# Patient Record
Sex: MALE | Race: WHITE
[De-identification: names, ages, dates, MRNs, and addresses within clinical notes are randomized per-mention and may not be internally consistent; named-entity substitution may affect disease eponyms.]

---

## 2018-05-18 ENCOUNTER — HOSPITAL ENCOUNTER (OUTPATIENT)
Dept: HOSPITAL 62 - OD | Age: 53
End: 2018-05-18
Attending: FAMILY MEDICINE
Payer: MEDICARE
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
## 2018-05-18 DIAGNOSIS — S39.012A: Primary | ICD-10-CM

## 2018-05-18 PROCEDURE — 72110 X-RAY EXAM L-2 SPINE 4/>VWS: CPT

## 2018-05-18 NOTE — RADIOLOGY REPORT (SQ)
EXAM DESCRIPTION:  LUMBAR SPINE COMPLETE



COMPLETED DATE/TIME:  5/18/2018 10:37 am



REASON FOR STUDY:  STRAIN OF MUSCLE, FASCIA AND TENDON OF LOWER BACK, INIT S39.012A  STRAIN OF MUSCLE
, FASCIA AND TENDON OF LOWER BACK,



COMPARISON:  None.



NUMBER OF VIEWS:  Five views including obliques.



TECHNIQUE:  AP, lateral, oblique, and sacral radiographic images acquired of the lumbar spine.



LIMITATIONS:  None.



FINDINGS:  MINERALIZATION: Normal.

SEGMENTATION: Normal.  No transitional anatomy.

ALIGNMENT: Normal.

VERTEBRAE: Maintained height.  No fracture or worrisome bone lesion.

DISCS: Preserved height.  No significant osteophytes or end plate irregularity.

POSTERIOR ELEMENTS: Pedicles and facets are intact.  No pars defect or posterior arch defects.  Mild 
bilateral facet arthropathy at L4-5 and L5-S1

HARDWARE: None in the spine.

PARASPINAL SOFT TISSUES: Normal.

PELVIS: Intact as visualized. No fractures or worrisome bone lesions. SI joints intact.

OTHER: No other significant finding.



IMPRESSION:  Mild bilateral facet arthropathy lower lumbar spine



TECHNICAL DOCUMENTATION:  JOB ID:  [id number]

 [employer]- All Rights Reserved



Reading location - IP/workstation name: [facility]

## 2019-08-26 ENCOUNTER — HOSPITAL ENCOUNTER (OUTPATIENT)
Dept: HOSPITAL 62 - END | Age: 54
Discharge: HOME | End: 2019-08-26
Attending: INTERNAL MEDICINE
Payer: MEDICARE

## 2019-08-26 VITALS — SYSTOLIC BLOOD PRESSURE: 124 MMHG | DIASTOLIC BLOOD PRESSURE: 76 MMHG

## 2019-08-26 DIAGNOSIS — Z87.891: ICD-10-CM

## 2019-08-26 DIAGNOSIS — Z12.11: Primary | ICD-10-CM

## 2019-08-26 DIAGNOSIS — I10: ICD-10-CM

## 2019-08-26 DIAGNOSIS — K57.30: ICD-10-CM

## 2019-08-26 DIAGNOSIS — Z80.0: ICD-10-CM

## 2019-08-26 DIAGNOSIS — D12.0: ICD-10-CM

## 2019-08-26 PROCEDURE — 00811 ANES LWR INTST NDSC NOS: CPT

## 2019-08-26 PROCEDURE — 45380 COLONOSCOPY AND BIOPSY: CPT

## 2019-08-26 PROCEDURE — 88305 TISSUE EXAM BY PATHOLOGIST: CPT

## 2019-08-26 NOTE — OPERATIVE REPORT
Operative Report


DATE OF SURGERY: 08/26/19


Operative Report: 





The risks, benefits and alternatives of the procedure including the risk of 

bleeding, perforation requiring surgery have been explained to the patient in 

detail and informed consent has been obtained.  Patient is taken back to the 

endoscopy suite and placed in the left, lateral decubital position.  Timeout was

called.  Propofol medication is administered.  Rectal examination is done which 

did not reveal any masses, tears or fissures.  An Olympus videoscope was 

introduced into the patient's rectum.  The scope was then carefully advanced all

the way to the cecum.  The cecum was identified by the usual anatomical 

landmarks including the ileocecal valve as well as the appendiceal office.  

Photodocumentation is obtained.  The scope was then sequentially pulled back via

the various segments of the colon including the ascending colon, hepatic 

flexure, transverse colon, splenic flexure, descending colon and finally into 

the rectosigmoid portions of the colon.  Retroflexion maneuvers performed.


PREOPERATIVE DIAGNOSIS: Colorectal cancer screening


POSTOPERATIVE DIAGNOSIS: Cecal polyp that is able to be very removed via biopsy 

forceps.  Diverticulosis without any evidence of diverticulitis


OPERATION: Colonoscopy with biopsy


SURGEON: DESI CRANE


ANESTHESIA: LMAC


TISSUE REMOVED OR ALTERED: As noted above.


COMPLICATIONS: 





None.


ESTIMATED BLOOD LOSS: None.


INTRAOPERATIVE FINDINGS: As noted above.


PROCEDURE: 





Patient tolerated the procedure well.


No immediate postprocedure complications are noted.


Patient is discharged in good condition.


Discharge date 8/26/2019.


Discharge diet: Regular.


Discharge activity: Regular.


2 to 3-week follow-up to discuss findings.


Patient is instructed to call the office or proceed to the emergency room should

there be any further problems or questions.


Wait on the pathology.


3 to 5-year surveillance colonoscopy.

## 2020-06-02 ENCOUNTER — HOSPITAL ENCOUNTER (OUTPATIENT)
Dept: HOSPITAL 62 - SP | Age: 55
End: 2020-06-02
Payer: MEDICAID

## 2020-06-02 DIAGNOSIS — R60.0: Primary | ICD-10-CM

## 2020-06-02 PROCEDURE — 93971 EXTREMITY STUDY: CPT

## 2020-06-02 NOTE — RADIOLOGY REPORT (SQ)
EXAM DESCRIPTION:  VENOUS UNILATERAL LOWER



IMAGES COMPLETED DATE/TIME:  6/2/2020 3:28 pm



REASON FOR STUDY:  LLE PAIN R60.0  LOCALIZED EDEMA



COMPARISON:  None.



TECHNIQUE:  Dynamic and static gray scale and color images acquired of the left leg venous system. Se
lected spectral images acquired with additional compression and augmentation maneuvers. The contralat
eral common femoral vein and saphenofemoral junction were also imaged. Images stored on PACS.



LIMITATIONS:  None.



FINDINGS:  COMMON FEMORAL: Normal phasicity, compression and augmentation. No visualized echogenic ma
terial on gray scale. No defects on color images.

FEMORAL: Normal compression and augmentation. No visualized echogenic material on gray scale. No defe
cts on color images.

POPLITEAL: Normal compression, augmentation. No visualized echogenic material on gray scale. No defec
ts on color images.

CALF VESSELS: Normal compression, augmentation. No visualized echogenic material on gray scale. No de
fects on color images.

GSV and SSV: Normal compression, augmentation. No visualized echogenic material on gray scale. No def
ects on color images.

ANY DEEP VENOUS INSUFFICIENCY: No.

ANY EVIDENCE OF POPLITEAL CYST: No.

OTHER: No other significant finding.

CONTRALATERAL COMMON FEMORAL VEIN AND SAPHENOFEMORAL JUNCTION:

Normal phasicity, compression and augmentation. No visualized echogenic material on gray scale. No de
fects on color images.



IMPRESSION:  NO EVIDENCE DVT OR SVT IN THE LEFT LEG.



TECHNICAL DOCUMENTATION:  JOB ID:  [id number]

 2011 [employer]- All Rights Reserved



Reading location - IP/workstation name: [email or web address]

## 2020-12-25 ENCOUNTER — HOSPITAL ENCOUNTER (EMERGENCY)
Dept: HOSPITAL 62 - ER | Age: 55
Discharge: LEFT BEFORE DISCHARGE | End: 2020-12-25
Payer: MEDICARE

## 2020-12-25 VITALS — DIASTOLIC BLOOD PRESSURE: 87 MMHG | SYSTOLIC BLOOD PRESSURE: 186 MMHG

## 2020-12-25 DIAGNOSIS — N28.9: ICD-10-CM

## 2020-12-25 DIAGNOSIS — Z53.29: ICD-10-CM

## 2020-12-25 DIAGNOSIS — I44.7: ICD-10-CM

## 2020-12-25 DIAGNOSIS — R10.11: ICD-10-CM

## 2020-12-25 DIAGNOSIS — R10.811: ICD-10-CM

## 2020-12-25 DIAGNOSIS — Z79.899: ICD-10-CM

## 2020-12-25 DIAGNOSIS — K76.0: ICD-10-CM

## 2020-12-25 DIAGNOSIS — R11.0: ICD-10-CM

## 2020-12-25 DIAGNOSIS — R10.13: ICD-10-CM

## 2020-12-25 DIAGNOSIS — E87.5: ICD-10-CM

## 2020-12-25 DIAGNOSIS — Z87.891: ICD-10-CM

## 2020-12-25 DIAGNOSIS — R79.89: ICD-10-CM

## 2020-12-25 DIAGNOSIS — K80.70: Primary | ICD-10-CM

## 2020-12-25 DIAGNOSIS — Z20.828: ICD-10-CM

## 2020-12-25 DIAGNOSIS — I10: ICD-10-CM

## 2020-12-25 LAB
ADD MANUAL DIFF: NO
ALBUMIN SERPL-MCNC: 4.2 G/DL (ref 3.5–5)
ALP SERPL-CCNC: 27 U/L (ref 38–126)
ANION GAP SERPL CALC-SCNC: 5 MMOL/L (ref 5–19)
APPEARANCE UR: CLEAR
APTT PPP: (no result) S
AST SERPL-CCNC: 51 U/L (ref 17–59)
BASOPHILS # BLD AUTO: 0 10^3/UL (ref 0–0.2)
BASOPHILS NFR BLD AUTO: 0.4 % (ref 0–2)
BILIRUB DIRECT SERPL-MCNC: 0.2 MG/DL (ref 0–0.4)
BILIRUB SERPL-MCNC: 0.6 MG/DL (ref 0.2–1.3)
BILIRUB UR QL STRIP: NEGATIVE
BUN SERPL-MCNC: 22 MG/DL (ref 7–20)
CALCIUM: 9.8 MG/DL (ref 8.4–10.2)
CHLORIDE SERPL-SCNC: 92 MMOL/L (ref 98–107)
CO2 SERPL-SCNC: 37 MMOL/L (ref 22–30)
EOSINOPHIL # BLD AUTO: 0.3 10^3/UL (ref 0–0.6)
EOSINOPHIL NFR BLD AUTO: 7.3 % (ref 0–6)
ERYTHROCYTE [DISTWIDTH] IN BLOOD BY AUTOMATED COUNT: 13.3 % (ref 11.5–14)
GLUCOSE SERPL-MCNC: 124 MG/DL (ref 75–110)
GLUCOSE UR STRIP-MCNC: NEGATIVE MG/DL
HCT VFR BLD CALC: 34.7 % (ref 37.9–51)
HGB BLD-MCNC: 12.3 G/DL (ref 13.5–17)
KETONES UR STRIP-MCNC: NEGATIVE MG/DL
LYMPHOCYTES # BLD AUTO: 1.4 10^3/UL (ref 0.5–4.7)
LYMPHOCYTES NFR BLD AUTO: 28.5 % (ref 13–45)
MCH RBC QN AUTO: 33.6 PG (ref 27–33.4)
MCHC RBC AUTO-ENTMCNC: 35.5 G/DL (ref 32–36)
MCV RBC AUTO: 95 FL (ref 80–97)
MONOCYTES # BLD AUTO: 0.6 10^3/UL (ref 0.1–1.4)
MONOCYTES NFR BLD AUTO: 12.3 % (ref 3–13)
NEUTROPHILS # BLD AUTO: 2.4 10^3/UL (ref 1.7–8.2)
NEUTS SEG NFR BLD AUTO: 51.5 % (ref 42–78)
NITRITE UR QL STRIP: NEGATIVE
PH UR STRIP: 8 [PH] (ref 5–9)
PLATELET # BLD: 175 10^3/UL (ref 150–450)
POTASSIUM SERPL-SCNC: 5.2 MMOL/L (ref 3.6–5)
PROT SERPL-MCNC: 7.3 G/DL (ref 6.3–8.2)
PROT UR STRIP-MCNC: NEGATIVE MG/DL
RBC # BLD AUTO: 3.66 10^6/UL (ref 4.35–5.55)
SP GR UR STRIP: 1.01
TOTAL CELLS COUNTED % (AUTO): 100 %
UROBILINOGEN UR-MCNC: NEGATIVE MG/DL (ref ?–2)
WBC # BLD AUTO: 4.7 10^3/UL (ref 4–10.5)

## 2020-12-25 PROCEDURE — 93005 ELECTROCARDIOGRAM TRACING: CPT

## 2020-12-25 PROCEDURE — 93976 VASCULAR STUDY: CPT

## 2020-12-25 PROCEDURE — 76705 ECHO EXAM OF ABDOMEN: CPT

## 2020-12-25 PROCEDURE — 99285 EMERGENCY DEPT VISIT HI MDM: CPT

## 2020-12-25 PROCEDURE — 85025 COMPLETE CBC W/AUTO DIFF WBC: CPT

## 2020-12-25 PROCEDURE — S0119 ONDANSETRON 4 MG: HCPCS

## 2020-12-25 PROCEDURE — 81001 URINALYSIS AUTO W/SCOPE: CPT

## 2020-12-25 PROCEDURE — 84484 ASSAY OF TROPONIN QUANT: CPT

## 2020-12-25 PROCEDURE — 36415 COLL VENOUS BLD VENIPUNCTURE: CPT

## 2020-12-25 PROCEDURE — 80053 COMPREHEN METABOLIC PANEL: CPT

## 2020-12-25 PROCEDURE — 93010 ELECTROCARDIOGRAM REPORT: CPT

## 2020-12-25 PROCEDURE — 83690 ASSAY OF LIPASE: CPT

## 2020-12-25 NOTE — RADIOLOGY REPORT (SQ)
EXAM DESCRIPTION:  U/S ABDOMEN LTD W/DOPPLER



IMAGES COMPLETED DATE/TIME:  12/25/2020 7:19 pm



REASON FOR STUDY:  ruq pain



COMPARISON:  None.



TECHNIQUE:  Dynamic and static grayscale images acquired of the abdomen and recorded on PACS. Additio
nal selected color Doppler and spectral images recorded.



LIMITATIONS:  Intervening bowel gas precludes evaluation of the pancreas and aorta.



FINDINGS:  PANCREAS: Not adequately evaluated.

LIVER: Hepatic steatosis.  No focal mass.  No intrahepatic biliary dilatation.

LIVER VASCULATURE: Normal directional flow of the main portal vein and hepatic veins.

GALLBLADDER: No mural thickening or pericholecystic fluid.  Few a tiny nonshadowing gallstones may be
 present.

ULTRASOUND-DETECTED MCKEON'S SIGN: Not documented.

INTRAHEPATIC DUCTS AND COMMON DUCT: CBD and intrahepatic ducts normal caliber. No filling defects.

INFERIOR VENA CAVA: Normal flow.

AORTA: Not adequately evaluated

RIGHT KIDNEY:  Normal size. Normal echogenicity. No solid or suspicious masses. No hydronephrosis. No
 calcifications.

PERITONEAL AND RIGHT PLEURAL SPACE: No ascites or effusions.

OTHER: No other significant findings.



IMPRESSION:  Intervening bowel gas precludes adequate evaluation of the pancreas and aorta.  Cholelit
hiasis without evidence of cholecystitis.  Hepatic steatosis.



TECHNICAL DOCUMENTATION:  JOB ID:  [id number]

 2011 [employer]- All Rights Reserved



Reading location - IP/workstation name: [email or web address]

## 2020-12-25 NOTE — ER DOCUMENT REPORT
ED GI/





- General


Mode of Arrival: Ambulatory


TRAVEL OUTSIDE OF THE [place] IN LAST 30 DAYS: No





- Related Data


Home Medications: see list on chart.





<SARITA AGUIRRE - Last Filed: 12/25/20 20:05>





<KEE MORRISON - Last Filed: 12/26/20 00:26>





- General


Chief Complaint: Upper Abdominal Pain


Stated Complaint: ABDOMINAL PAIN


Time Seen by Provider: 12/25/20 18:11


Primary Care Provider: 


SARITA VENTURA FNP-C [Primary Care Provider] - Follow up as needed


HERBER GALVEZ MD [ACTIVE STAFF] - Follow up as needed


ROB MELVIN MD [ACTIVE PROVISIONAL STAFF] - Follow up tomorrow


Notes: 





CHIEF COMPLAINT: Upper abdominal pain after eating





HPI: 55-year-old male presenting for upper abdominal discomfort and bloating 

after eating a meal this afternoon.  Discomfort began around 2.  Did radiate up 

into the chest.  Wife states that patient had been diagnosed last week with 

gallbladder sludge and he believes this is causing his symptoms.  The have not 

been able to obtain a surgical referral because they have to see the primary 

first and they do not have an appointment until January 5.  No vomiting.  

Describes a bloating sensation with nausea





ROS: See HPI - all other systems were reviewed and are otherwise negative


Constitutional: no fever 


Eyes: no drainage, no blurred vision


ENT: no runny nose, no sore throat


Cardiovascular:  no chest pain 


Resp: no SOB, no cough


GI: no vomiting, no diarrhea, + abdominal pain, positive nausea


: no dysuria


Integumentary: no rash 


Allergy: no hives 


Musculoskeletal: no extremity pain or swelling 


Neurological: no numbness/tingling, no weakness





MEDICATIONS: I agree with the patient medications as charted by the RN.





ALLERGIES: I agree with the allergies as charted by the RN.





PAST MEDICAL HISTORY/PAST SURGICAL HISTORY: Reviewed and agree as charted by RN.





SOCIAL HISTORY: Reviewed and agree as charted by RN.





FAMILY HISTORY: No significant familial comorbid conditions directly related to 

patient complaint





EXAM:


Reviewed vital signs as charted by RN.


CONSTITUTIONAL: Alert and oriented and responds appropriately to questions. 

Well-appearing; well-nourished


HEAD: Normocephalic; atraumatic


EYES: PERRL; Conjunctivae clear, sclerae non-icteric


ENT: normal nose; no rhinorrhea; moist mucous membranes; pharynx without lesions

noted, no uvula edema or deviation, no tonsillar hypertrophy, phonation normal


NECK: Supple without meningismus; non-tender; no cervical lymphadenopathy, no 

masses


CARD: RRR; no murmurs, no clicks, no rubs, no gallops; symmetric distal pulses


RESP: Normal chest excursion without splinting or tachypnea; breath sounds clear

and equal bilaterally; no wheezes, no rhonchi, no rales, pulse oximetry 97% on 

room air not hypoxic


ABD/GI: Normal bowel sounds; non-distended; soft, mild tenderness in the right 

upper quadrant region on palpation   , no rebound, no guarding; no palpable 

organomegaly or masses.


BACK:  The back appears normal and is non-tender to palpation, there is no CVA 

tenderness


EXT: Normal ROM in all joints; non-tender to palpation; no cyanosis, no 

effusions, no edema   


SKIN: Normal color for age and race; warm; dry; good turgor; no acute lesions 

noted


NEURO: Moves all extremities equally; Motor and sensory function intact 


PSYCH: The patient's mood and manner are appropriate. Grooming and personal 

hygiene are appropriate.





MDM: 55-year-old male with epigastric and right upper quadrant abdominal pain 

following eating lunch today.  Complains of bloating sensation with discomfort 

radiating up from the abdomen into the chest.  He has reproducible pain in the 

right upper quadrant states he had been diagnosed with gallbladder sludge a week

ago.  His EKG is sinus rhythm with a ventricular rate of 87,   QTC 

42.  There is a left bundle branch block.  He has reproducible abdominal pain 

this is likely biliary colic.  Awaiting lab work and reassessment





The patient was evaluated during the global COVID-19 pandemic and that diagnosis

was suspected/considered upon their initial presentation.  Their evaluation, 

treatment and testing was consistent with current guidelines for patients who 

present with complaints or symptoms that may be related to COVID-19 (SARITA SANTANA)





- Related Data


Allergies/Adverse Reactions: 


                                        





No Known Allergies Allergy (Verified 12/25/20 18:37)


   











Past Medical History





- General


Information source: Patient





- Social History


Smoking Status: Former Smoker


Chew tobacco use (# tins/day): No


Frequency of alcohol use: Social


Drug Abuse: None


Family History: Reviewed & Not Pertinent


Patient has homicidal ideation: No





- Past Medical History


Cardiac Medical History: Reports: Hx Atrial Fibrillation, Hx Hypercholest

erolemia, Hx Hypertension


   Denies: Hx Coronary Artery Disease - HIGH CHOL, Hx Heart Attack


Pulmonary Medical History: 


   Denies: Hx Asthma, Hx Bronchitis, Hx COPD, Hx Pneumonia


Neurological Medical History: Denies: Hx Cerebrovascular Accident, Hx Seizures


GI Medical History: Reports: Hx Gastroesophageal Reflux Disease


Musculoskeletal Medical History: Reports Hx Arthritis, Reports Hx 

Musculoskeletal Deformity, Reports Hx Musculoskeletal Trauma


Past Surgical History: Reports: Hx Adenoidectomy, Hx Cardiac Catheterization - 

08/20/20 clean, Hx Oral Surgery, Hx Orthopedic Surgery - left knee replacement, 

2LACL, Hx Tonsillectomy





- Immunizations


Hx Diphtheria, Pertussis, Tetanus Vaccination: Yes





<SARITA AGUIRRE - Last Filed: 12/25/20 20:05>





Physical Exam





- Vital signs


Vitals: 


                                        











Temp Pulse Resp BP Pulse Ox


 


 98.2 F   100   20   186/87 H  96 


 


 12/25/20 17:19  12/25/20 17:19  12/25/20 17:19  12/25/20 17:19  12/25/20 17:19














Course





- Laboratory Results


Result Diagrams: 


                                 12/25/20 18:30





                                 12/25/20 18:30





<SARITA AGUIRRE - Last Filed: 12/25/20 20:05>





- Laboratory Results


Result Diagrams: 


                                 12/25/20 18:30





                                 12/25/20 18:30


Critical Laboratory Results Reviewed: No Critical Results





- Radiology Results


Critical Radiology Results Reviewed: No Critical Results





<KEE MORRISON - Last Filed: 12/26/20 00:26>





- Re-evaluation


Re-evalutation: 





12/25/20 19:46


Patient is feeling much better.  He declines pain medicine in the ER I will send

him with a take-home pack.  Ultrasound shows cholelithiasis without 

cholecystitis.  Return instructions were discussed.  Patient does take Lasix and

potassium replacement.  He has mild renal insufficiency.  He has mild 

hyperkalemia.  He will hold his potassium for the next 2 days.  He will increase

his fluid intake over the next 2 days.  I will give him referral to surgery.  I 

am awaiting a troponin at this time although I suspect that this is likely 

biliary colic.


12/25/20 20:05


Case was discussed with Dr. Lala attending.  Patient's troponin 0.014, not 

completely negative although I suspect that patient has biliary colic.  I spoke 

with the patient about this he is willing to stay for second troponin.  Report 

to PAULINE Nunn who will follow and disposition (SARITA AGUIRRE)





12/25/20 20:00 Patient sign out given by Sarita Webb NP. 3 hour troponin 

ordered as initial troponin minimally elevated at 0.014. Will await results.  

Patient informed. 





12/25/20 22:30 3 hour troponin increased at 0.039.  I consulted my supervising 

physician, Dr. Hernandez, concerning this patient who recommends I consult 

cardiology due to left bundle branch block of unknown age and slowly increasing 

troponins. No prior EKGs on file to compare to. I spoke with the patient and he 

denies any prior diagnosed of LBBB.  His cardiologist is Dr. Melvin at [facility] in 

[place] and he has an appointment in early February. 





12/25/20 22:58 I spoke with the cardiologist on-call, Dr. Mayer, who 

recommends that the patient can follow up outpatient on Monday, as the patient 

denies any chest pain and his troponin is barely elevated. 





12/25/20 23:12 I spoke with Dr. Martin who is on-call for [facility].  He recommends 

admitting the patient for observation but states that a new LBBB without chest 

pain is not an indication for emergent catherization or further NSTEMI workup. 

He recommends that cardiology consult on the patient in the morning. 





12/25/20 23:23 I spoke with the patient concerning my discussions with both 

cardiologist and the final recommendation for admission for observation.  

However, patient is declining admission and states he needs to go home to take 

care of his dogs. Patient will sign out AMA. 





The patient has chosen to leave the facility against medical advice. The 

relevant issues have been reviewed and discussed with the patient and family at 

the bedside. At the time of this assessment there is no indication for 

involuntary commitment. The patient is alert, oriented, and able to express 

clearly their reasoning for not wanting to remain in the emergency department 

for further treatment. The patient is not clinically psychotic, intoxicated, and

denies and suicidal ideation. 





Differential or suspected diagnoses based on medical screening exam:  upper 

abdominal pain, cholelithiasis, left bundle branch block.





The patient is aware of the concerning diagnoses and acknowledges understanding 

of the reasons for the following recommendations: admission for observation with

consultation by cardiology in the morning. 





The following recommendations/services were offered and refused: admission, 

cardiology consultation





The following risks were explained: Death, permanent disability, loss of 

function, missed diagnosis and heart attack





Clinical impression: Patient is competent to make decisions regarding the 

medical advice that is being offered.





 (KEE MORRISON)





- Vital Signs


Vital signs: 


                                        











Temp Pulse Resp BP Pulse Ox


 


 98.2 F   100   20   186/87 H  96 


 


 12/25/20 17:19  12/25/20 17:19  12/25/20 17:19  12/25/20 17:19  12/25/20 17:19














- Laboratory Results


Laboratory Results Interpreted: 


                                        











  12/25/20 12/25/20





  18:30 18:30


 


RBC  3.66 L 


 


Hgb  12.3 L 


 


Hct  34.7 L 


 


MCH  33.6 H 


 


Eos % (Auto)  7.3 H 


 


Sodium   134.2 L


 


Potassium   5.2 H


 


Chloride   92 L


 


Carbon Dioxide   37 H


 


BUN   22 H


 


Creatinine   1.54 H


 


Est GFR ( Amer)   57 L


 


Est GFR (MDRD) Non-Af   47 L


 


Glucose   124 H


 


ALT   68 H


 


Alkaline Phosphatase   27 L














Discharge





<SARITA AGUIRRE - Last Filed: 12/25/20 20:05>





<KEE MORRISON - Last Filed: 12/26/20 00:26>





- Discharge


Clinical Impression: 


 Biliary colic, Renal insufficiency, Hyperkalemia, Left bundle branch block





Cholelithiasis


Qualifiers:


 Cholelithiasis location: gallbladder Cholecystitis presence: without 

cholecystitis Biliary obstruction: without biliary obstruction Qualified 

Code(s): K80.20 - Calculus of gallbladder without cholecystitis without 

obstruction





Condition: Stable


Disposition: AGAINST MEDICAL ADVICE


Additional Instructions: 


Return to the emergency department if you develop chest pain, shortness of 

breath, fever, or persistent vomiting. Follow up with your cardiologist as soon 

as possible. 


Referrals: 


SARITA VENTURA, BRITTNEYP-C [Primary Care Provider] - Follow up as needed


HERBER GALVEZ MD [ACTIVE STAFF] - Follow up as needed


ROB MELVIN MD [ACTIVE PROVISIONAL STAFF] - Follow up tomorrow

## 2020-12-25 NOTE — EKG REPORT
SEVERITY:- ABNORMAL ECG -

SINUS RHYTHM

LEFT BUNDLE BRANCH BLOCK

:

Confirmed by: Robert Griffin MD 25-Dec-2020 21:56:08

## 2020-12-25 NOTE — ER DOCUMENT REPORT
ED Medical Screen (RME)





- General


Chief Complaint: Abdominal Pain


Stated Complaint: ABDOMINAL PAIN


Time Seen by Provider: 12/25/20 18:11


Primary Care Provider: 


SARITA VENTURA FNP-C [Primary Care Provider] - Follow up as needed


Mode of Arrival: Ambulatory


Information source: Patient


Notes: 





HPI; 55-year-old male past medical history significant for hypertension, A. fib,

COPD, fatty liver, gallbladder disease presents to the emergency room 

complaining of sudden onset of right upper quadrant sharp stabbing pain that 

started shortly after having lunch.  Complains of nausea but no vomiting.  No 

fevers.  No diarrhea.  No COVID-19 exposure.





PE: Alert and oriented x3.  Lungs: Clear to auscultation without rales, rhonchi,

wheezes.  Heart: Irregular rate and rhythm without murmurs, rubs, gallops.








I have greeted and performed a rapid initial assessment of this patient.  A 

comprehensive ED assessment and evaluation of the patient, analysis of test 

results and completion of the medical decision making process will be conducted 

by additional ED providers.  I have specifically instructed the patient or 

family members with the patient to immediately return to any nursing staff 

should anything change in the patient's condition or with their chief complaint.


TRAVEL OUTSIDE OF THE [place] IN LAST 30 DAYS: No





- Related Data


Allergies/Adverse Reactions: 


                                        





No Known Allergies Allergy (Verified 08/26/19 09:27)


   











Past Medical History





- Past Medical History


Cardiac Medical History: Reports: Hx Hypertension


   Denies: Hx Coronary Artery Disease - HIGH CHOL, Hx Heart Attack


Pulmonary Medical History: 


   Denies: Hx Asthma, Hx Bronchitis, Hx COPD, Hx Pneumonia


Neurological Medical History: Denies: Hx Cerebrovascular Accident, Hx Seizures


Musculoskeltal Medical History: Reports Hx Arthritis, Reports Hx Musculoskeletal

Deformity, Reports Hx Musculoskeletal Trauma


Past Surgical History: Reports: Hx Adenoidectomy, Hx Oral Surgery, Hx Orthopedic

Surgery, Hx Tonsillectomy





- Immunizations


Hx Diphtheria, Pertussis, Tetanus Vaccination: Yes





Physical Exam





- Vital signs


Vitals: 





                                        











Temp Pulse Resp BP Pulse Ox


 


 98.2 F   100   20   186/87 H  96 


 


 12/25/20 17:19  12/25/20 17:19  12/25/20 17:19  12/25/20 17:19  12/25/20 17:19














Course





- Vital Signs


Vital signs: 





                                        











Temp Pulse Resp BP Pulse Ox


 


 98.2 F   100   20   186/87 H  96 


 


 12/25/20 17:19  12/25/20 17:19  12/25/20 17:19  12/25/20 17:19  12/25/20 17:19














Doctor's Discharge





- Discharge


Referrals: 


SARITA VENTURA, FNP-C [Primary Care Provider] - Follow up as needed